# Patient Record
Sex: FEMALE | Race: WHITE | NOT HISPANIC OR LATINO
[De-identification: names, ages, dates, MRNs, and addresses within clinical notes are randomized per-mention and may not be internally consistent; named-entity substitution may affect disease eponyms.]

---

## 2019-01-17 ENCOUNTER — FORM ENCOUNTER (OUTPATIENT)
Age: 64
End: 2019-01-17

## 2019-05-23 ENCOUNTER — FORM ENCOUNTER (OUTPATIENT)
Age: 64
End: 2019-05-23

## 2019-07-08 ENCOUNTER — FORM ENCOUNTER (OUTPATIENT)
Age: 64
End: 2019-07-08

## 2019-12-12 ENCOUNTER — FORM ENCOUNTER (OUTPATIENT)
Age: 64
End: 2019-12-12

## 2020-01-13 ENCOUNTER — FORM ENCOUNTER (OUTPATIENT)
Age: 65
End: 2020-01-13

## 2020-07-16 ENCOUNTER — FORM ENCOUNTER (OUTPATIENT)
Age: 65
End: 2020-07-16

## 2021-01-20 DIAGNOSIS — Z85.3 PERSONAL HISTORY OF MALIGNANT NEOPLASM OF BREAST: ICD-10-CM

## 2021-01-20 PROBLEM — Z00.00 ENCOUNTER FOR PREVENTIVE HEALTH EXAMINATION: Status: ACTIVE | Noted: 2021-01-20

## 2021-01-20 RX ORDER — TAMOXIFEN CITRATE 20 MG/1
20 TABLET, FILM COATED ORAL DAILY
Qty: 90 | Refills: 3 | Status: ACTIVE | COMMUNITY
Start: 2021-01-20 | End: 1900-01-01

## 2021-01-21 DIAGNOSIS — Z92.3 PERSONAL HISTORY OF IRRADIATION: ICD-10-CM

## 2021-01-22 DIAGNOSIS — H40.9 UNSPECIFIED GLAUCOMA: ICD-10-CM

## 2021-01-22 DIAGNOSIS — Z80.49 FAMILY HISTORY OF MALIGNANT NEOPLASM OF OTHER GENITAL ORGANS: ICD-10-CM

## 2021-01-22 DIAGNOSIS — E03.9 HYPOTHYROIDISM, UNSPECIFIED: ICD-10-CM

## 2021-01-22 DIAGNOSIS — Z78.0 ASYMPTOMATIC MENOPAUSAL STATE: ICD-10-CM

## 2021-01-22 DIAGNOSIS — R92.0 MAMMOGRAPHIC MICROCALCIFICATION FOUND ON DIAGNOSTIC IMAGING OF BREAST: ICD-10-CM

## 2021-01-22 DIAGNOSIS — Z80.3 FAMILY HISTORY OF MALIGNANT NEOPLASM OF BREAST: ICD-10-CM

## 2021-01-22 DIAGNOSIS — Z86.000 PERSONAL HISTORY OF IN-SITU NEOPLASM OF BREAST: ICD-10-CM

## 2021-01-22 DIAGNOSIS — I10 ESSENTIAL (PRIMARY) HYPERTENSION: ICD-10-CM

## 2021-01-22 DIAGNOSIS — Z87.891 PERSONAL HISTORY OF NICOTINE DEPENDENCE: ICD-10-CM

## 2021-01-22 DIAGNOSIS — D05.11 INTRADUCTAL CARCINOMA IN SITU OF RIGHT BREAST: ICD-10-CM

## 2021-01-22 DIAGNOSIS — Z78.9 OTHER SPECIFIED HEALTH STATUS: ICD-10-CM

## 2021-01-22 DIAGNOSIS — Z80.41 FAMILY HISTORY OF MALIGNANT NEOPLASM OF OVARY: ICD-10-CM

## 2021-01-22 DIAGNOSIS — Z92.29 PERSONAL HISTORY OF OTHER DRUG THERAPY: ICD-10-CM

## 2021-01-22 RX ORDER — BIMATOPROST 0.1 MG/ML
0.01 SOLUTION/ DROPS OPHTHALMIC
Refills: 0 | Status: ACTIVE | COMMUNITY

## 2021-01-22 RX ORDER — AMLODIPINE BESYLATE AND BENAZEPRIL HYDROCHLORIDE 5; 10 MG/1; MG/1
5-10 CAPSULE ORAL
Refills: 0 | Status: ACTIVE | COMMUNITY

## 2021-01-22 RX ORDER — LEVOTHYROXINE SODIUM 200 UG/1
200 TABLET ORAL
Refills: 0 | Status: ACTIVE | COMMUNITY

## 2021-01-26 ENCOUNTER — APPOINTMENT (OUTPATIENT)
Dept: BREAST CENTER | Facility: CLINIC | Age: 66
End: 2021-01-26
Payer: MEDICARE

## 2021-01-26 VITALS
HEIGHT: 64 IN | WEIGHT: 185 LBS | SYSTOLIC BLOOD PRESSURE: 132 MMHG | DIASTOLIC BLOOD PRESSURE: 84 MMHG | HEART RATE: 76 BPM | BODY MASS INDEX: 31.58 KG/M2

## 2021-01-26 DIAGNOSIS — Z86.79 PERSONAL HISTORY OF OTHER DISEASES OF THE CIRCULATORY SYSTEM: ICD-10-CM

## 2021-01-26 PROCEDURE — 99213 OFFICE O/P EST LOW 20 MIN: CPT

## 2021-01-26 NOTE — REASON FOR VISIT
[Follow-Up: _____] : a [unfilled] follow-up visit [FreeTextEntry1] : The patient comes in for routine 6-month follow-up with a history of undergoing a right breast upper outer quadrant partial mastectomy for DCIS with 12 negative nodes in October 1997 for which she received radiation therapy.  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 Rosales on January 7, 2019.

## 2021-01-26 NOTE — PHYSICAL EXAM
[Normocephalic] : normocephalic [Atraumatic] : atraumatic [EOMI] : extra ocular movement intact [Supple] : supple [No Supraclavicular Adenopathy] : no supraclavicular adenopathy [No Cervical Adenopathy] : no cervical adenopathy [Normal Sinus Rhythm] : normal sinus rhythm [Examined in the supine and seated position] : examined in the supine and seated position [Breast Mass Right Breast ___cm] : no masses [Breast Mass Left Breast ___cm] : no masses [No Axillary Lymphadenopathy] : no left axillary lymphadenopathy [Soft] : abdomen soft [Normal Bowel Sounds] : normal bowel sounds  [Breast Nipple Inversion Left] : nipple not inverted [Breast Nipple Retraction Left] : nipple not retracted [Breast Nipple Flattening Left] : nipple not flattened [Breast Nipple Fissures Left] : nipple not fissured [de-identified] : The patient has the right breast mastectomy and MANNY flap reconstruction with a left breast mastopexy.  She has an excellent cosmetic result and has no evidence of recurrence in the right MANNY flap or in the left breast.  She did have a nipple reconstruction on October 2019.  She has no axillary, supraclavicular, or cervical adenopathy. [de-identified] : Status post mastectomy and MANNY flap reconstruction [de-identified] : Status post left breast mastopexy for symmetry

## 2021-01-26 NOTE — HISTORY OF PRESENT ILLNESS
[FreeTextEntry1] : The patient is a 65-year-old G4, P3 postmenopausal white female with Delia, Egyptian, and Kyrgyz descent.  She underwent menarche at age 13.  She underwent menopause at age 53 and never took any hormone replacement therapy.  She has a family history of breast cancer with her paternal aunts with breast cancer in her 40s and her maternal grandmother who had ovarian and uterine cancer in her 50s.  The patient was found to have a significant area of microcalcifications in the upper outer aspect of the right breast on mammography in 1997 and underwent a right breast partial mastectomy and had extensive intermediate grade DCIS in 12 negative nodes on October 13, 1997.  The DCIS was ER/TN positive.  She underwent external beam radiation to the right breast but did not receive any adjuvant hormonal therapy.  She underwent [employer] genetic panel testing which was negative in March 2016.  She underwent her routine bilateral mammography and ultrasound in April 2018 and had a group of punctate calcifications in the right breast 12:00 region 3 cm from the nipple which had a fairly benign appearance.  She then underwent a follow-up diagnostic mammography on October 16, 2018 which continued to show these calcifications and stereotactic biopsy was performed on November 5, 2018 showing high-grade DCIS which was ER/TN strongly positive.  She underwent an MRI November 15, 2018 showing some postbiopsy changes in the 12:00 region 3 cm from the nipple with around a 1.4 cm area of residual enhancement.  This was fairly close to the skin and she underwent a right breast mastectomy with attempted sentinel lymph node biopsy and right-sided MANNY flap reconstruction and left-sided reduction mastopexy by Dr. Rosales on January 7, 2019.  The dye did not travel to the right axilla due to her prior axillary dissection so no further nodes were removed.  Final pathology just showed DCIS in the right breast with a close anterior margin but final anterior margin was negative.  She was placed on tamoxifen and comes in for routine follow-up.

## 2021-01-26 NOTE — PAST MEDICAL HISTORY
[Postmenopausal] : The patient is postmenopausal [Menarche Age ____] : age at menarche was [unfilled] [Menopause Age____] : age at menopause was [unfilled] [Total Preg ___] : G[unfilled] [Live Births ___] : P[unfilled]  [History of Hormone Replacement Treatment] : has no history of hormone replacement treatment

## 2021-01-26 NOTE — ASSESSMENT
[FreeTextEntry1] : The patient is a 65-year-old G4, P3 postmenopausal white female with Delia, [place]er, and Upper sorbian descent.  She underwent menarche at age 13.  She underwent menopause at age 53 and never took any hormone replacement therapy.  She has a family history of breast cancer with her paternal aunts with breast cancer in her 40s and her maternal grandmother who had ovarian and uterine cancer in her 50s.  The patient was found to have a significant area of microcalcifications in the upper outer aspect of the right breast on mammography in 1997 and underwent a right breast partial mastectomy and had extensive intermediate grade DCIS in 12 negative nodes on October 13, 1997.  The DCIS was ER/AL positive.  She underwent external beam radiation to the right breast but did not receive any adjuvant hormonal therapy.  She underwent [employer] genetic panel testing which was negative in March 2016.  She underwent her routine bilateral mammography and ultrasound in April 2018 and had a group of punctate calcifications in the right breast 12:00 region 3 cm from the nipple which had a fairly benign appearance.  She then underwent a follow-up diagnostic mammography on October 16, 2018 which continued to show these calcifications and stereotactic biopsy was performed on November 5, 2018 showing high-grade DCIS which was ER/AL strongly positive.  She underwent an MRI November 15, 2018 showing some postbiopsy changes in the 12:00 region 3 cm from the nipple with around a 1.4 cm area of residual enhancement.  This was fairly close to the skin and she underwent a right breast mastectomy with attempted sentinel lymph node biopsy and right-sided MANNY flap reconstruction and left-sided reduction mastopexy by Dr. Rosales on January 7, 2019.  The dye did not travel to the right axilla due to her prior axillary dissection so no further nodes were removed.  Final pathology just showed DCIS in the right breast with a close anterior margin but final anterior margin was negative.  She remains on tamoxifen.  She underwent her last left breast mammography and ultrasound on July 17, 2020 [facility] showing no suspicious findings.  On exam today, she has no evidence of recurrence.  She should follow-up again in 6 months and her next left breast mammography and ultrasound will be due at that time in July 2021.

## 2021-07-26 ENCOUNTER — APPOINTMENT (OUTPATIENT)
Dept: BREAST CENTER | Facility: CLINIC | Age: 66
End: 2021-07-26
Payer: MEDICARE

## 2021-07-26 ENCOUNTER — NON-APPOINTMENT (OUTPATIENT)
Age: 66
End: 2021-07-26

## 2021-07-26 VITALS
OXYGEN SATURATION: 98 % | SYSTOLIC BLOOD PRESSURE: 121 MMHG | BODY MASS INDEX: 31.58 KG/M2 | WEIGHT: 185 LBS | HEIGHT: 64 IN | DIASTOLIC BLOOD PRESSURE: 78 MMHG | HEART RATE: 68 BPM

## 2021-07-26 PROCEDURE — 99213 OFFICE O/P EST LOW 20 MIN: CPT

## 2021-07-26 NOTE — ASSESSMENT
[FreeTextEntry1] : The patient is a 66-year-old G4, P3 postmenopausal white female with Delia, Lebanese, and French descent.  She underwent menarche at age 13.  She underwent menopause at age 53 and never took any hormone replacement therapy.  She has a family history of breast cancer with her paternal aunts with breast cancer in her 40s and her maternal grandmother who had ovarian and uterine cancer in her 50s.  The patient was found to have a significant area of microcalcifications in the upper outer aspect of the right breast on mammography in 1997 and underwent a right breast partial mastectomy and had extensive intermediate grade DCIS in 12 negative nodes on October 13, 1997.  The DCIS was ER/NY positive.  She underwent external beam radiation to the right breast but did not receive any adjuvant hormonal therapy.  She underwent [employer] genetic panel testing which was negative in March 2016.  She underwent her routine bilateral mammography and ultrasound in April 2018 and had a group of punctate calcifications in the right breast 12:00 region 3 cm from the nipple which had a fairly benign appearance.  She then underwent a follow-up diagnostic mammography on October 16, 2018 which continued to show these calcifications and stereotactic biopsy was performed on November 5, 2018 showing high-grade DCIS which was ER/NY strongly positive.  She underwent an MRI November 15, 2018 showing some postbiopsy changes in the 12:00 region 3 cm from the nipple with around a 1.4 cm area of residual enhancement.  This was fairly close to the skin and she underwent a right breast mastectomy with attempted sentinel lymph node biopsy and right-sided MANNY flap reconstruction and left-sided reduction mastopexy by Dr. Rosales on January 7, 2019.  The dye did not travel to the right axilla due to her prior axillary dissection so no further nodes were removed.  Final pathology just showed DCIS in the right breast with a close anterior margin but final anterior margin was negative.  She remains on tamoxifen.  She underwent her last left breast mammography and ultrasound on July 19, 2021 [facility] showing no suspicious findings.  On exam today, she has no evidence of recurrence.  She should follow-up again in 6 months and her next left breast mammography and ultrasound will be due  in July 2022. CC of foot pain R s/p trip and fall yesterday. no head injury

## 2021-07-26 NOTE — HISTORY OF PRESENT ILLNESS
[FreeTextEntry1] : The patient is a 66-year-old G4, P3 postmenopausal white female with Delia, South African, and Kiswahili descent.  She underwent menarche at age 13.  She underwent menopause at age 53 and never took any hormone replacement therapy.  She has a family history of breast cancer with her paternal aunts with breast cancer in her 40s and her maternal grandmother who had ovarian and uterine cancer in her 50s.  The patient was found to have a significant area of microcalcifications in the upper outer aspect of the right breast on mammography in 1997 and underwent a right breast partial mastectomy and had extensive intermediate grade DCIS in 12 negative nodes on October 13, 1997.  The DCIS was ER/NE positive.  She underwent external beam radiation to the right breast but did not receive any adjuvant hormonal therapy.  She underwent [employer] genetic panel testing which was negative in March 2016.  She underwent her routine bilateral mammography and ultrasound in April 2018 and had a group of punctate calcifications in the right breast 12:00 region 3 cm from the nipple which had a fairly benign appearance.  She then underwent a follow-up diagnostic mammography on October 16, 2018 which continued to show these calcifications and stereotactic biopsy was performed on November 5, 2018 showing high-grade DCIS which was ER/NE strongly positive.  She underwent an MRI November 15, 2018 showing some postbiopsy changes in the 12:00 region 3 cm from the nipple with around a 1.4 cm area of residual enhancement.  This was fairly close to the skin and she underwent a right breast mastectomy with attempted sentinel lymph node biopsy and right-sided MANNY flap reconstruction and left-sided reduction mastopexy by Dr. Rosales on January 7, 2019.  The dye did not travel to the right axilla due to her prior axillary dissection so no further nodes were removed.  Final pathology just showed DCIS in the right breast with a close anterior margin but final anterior margin was negative.  She was placed on tamoxifen and comes in for routine follow-up.

## 2021-07-26 NOTE — PHYSICAL EXAM
[Normocephalic] : normocephalic [Atraumatic] : atraumatic [EOMI] : extra ocular movement intact [Supple] : supple [No Supraclavicular Adenopathy] : no supraclavicular adenopathy [Normal Sinus Rhythm] : normal sinus rhythm [No Cervical Adenopathy] : no cervical adenopathy [Examined in the supine and seated position] : examined in the supine and seated position [Breast Mass Right Breast ___cm] : no masses [Breast Mass Left Breast ___cm] : no masses [No Axillary Lymphadenopathy] : no left axillary lymphadenopathy [Soft] : abdomen soft [No dominant masses] : no dominant masses in right breast  [No dominant masses] : no dominant masses left breast [No Nipple Retraction] : no left nipple retraction [No Nipple Discharge] : no left nipple discharge [Breast Nipple Inversion Left] : nipple not inverted [Breast Nipple Retraction Left] : nipple not retracted [Breast Nipple Flattening Left] : nipple not flattened [Breast Nipple Fissures Left] : nipple not fissured [Breast Abnormal Lactation (Galactorrhea) Left] : no galactorrhea [Breast Abnormal Secretion Bloody Fluid Left] : no bloody discharge [Breast Abnormal Secretion Serous Fluid Left] : no serous discharge [Breast Abnormal Secretion Opalescent Fluid Left] : no milky discharge [No Edema] : no edema [No Rashes] : no rashes [No Ulceration] : no ulceration [de-identified] : The patient has the right breast mastectomy and MANNY flap reconstruction with a left breast mastopexy.  She has an excellent cosmetic result and has no evidence of recurrence in the right MANNY flap or in the left breast.  She did have a nipple reconstruction on October 2019.  She has no axillary, supraclavicular, or cervical adenopathy. [de-identified] : Status post mastectomy and MANNY flap reconstruction with no evidence of recurrence [de-identified] : Status post left breast mastopexy for symmetry

## 2021-07-30 RX ORDER — TAMOXIFEN CITRATE 20 MG/1
20 TABLET, FILM COATED ORAL DAILY
Qty: 90 | Refills: 3 | Status: ACTIVE | COMMUNITY
Start: 2021-07-30 | End: 1900-01-01

## 2022-01-06 NOTE — PHYSICAL EXAM
[Normocephalic] : normocephalic [Atraumatic] : atraumatic [EOMI] : extra ocular movement intact [Supple] : supple [No Supraclavicular Adenopathy] : no supraclavicular adenopathy [No Cervical Adenopathy] : no cervical adenopathy [Normal Sinus Rhythm] : normal sinus rhythm [Examined in the supine and seated position] : examined in the supine and seated position [No dominant masses] : no dominant masses in right breast  [No dominant masses] : no dominant masses left breast [No Nipple Retraction] : no left nipple retraction [No Nipple Discharge] : no left nipple discharge [Breast Mass Right Breast ___cm] : no masses [Breast Nipple Inversion Left] : nipple not inverted [Breast Nipple Retraction Left] : nipple not retracted [Breast Nipple Flattening Left] : nipple not flattened [Breast Nipple Fissures Left] : nipple not fissured [Breast Abnormal Lactation (Galactorrhea) Left] : no galactorrhea [Breast Abnormal Secretion Bloody Fluid Left] : no bloody discharge [Breast Abnormal Secretion Serous Fluid Left] : no serous discharge [Breast Abnormal Secretion Opalescent Fluid Left] : no milky discharge [Breast Mass Left Breast ___cm] : no masses [No Axillary Lymphadenopathy] : no left axillary lymphadenopathy [Soft] : abdomen soft [No Edema] : no edema [No Rashes] : no rashes [No Ulceration] : no ulceration [de-identified] : The patient has the right breast mastectomy and MANNY flap reconstruction with a left breast mastopexy.  She has an excellent cosmetic result and has no evidence of recurrence in the right MANNY flap or in the left breast.  She did have a nipple reconstruction on October 2019.  She has no axillary, supraclavicular, or cervical adenopathy. [de-identified] : Status post mastectomy and MANNY flap reconstruction with no evidence of recurrence [de-identified] : Status post left breast mastopexy for symmetry

## 2022-01-06 NOTE — HISTORY OF PRESENT ILLNESS
[FreeTextEntry1] : The patient is a 66-year-old G4, P3 postmenopausal white female with Delia, Congolese, and Khmer descent.  She underwent menarche at age 13.  She underwent menopause at age 53 and never took any hormone replacement therapy.  She has a family history of breast cancer with her paternal aunts with breast cancer in her 40s and her maternal grandmother who had ovarian and uterine cancer in her 50s.  The patient was found to have a significant area of microcalcifications in the upper outer aspect of the right breast on mammography in 1997 and underwent a right breast partial mastectomy and had extensive intermediate grade DCIS and 12 negative nodes on October 13, 1997.  The DCIS was ER/WY positive.  She underwent external beam radiation to the right breast but did not receive any adjuvant hormonal therapy.  She underwent [employer] genetic panel testing which was negative in March 2016.  She underwent her routine bilateral mammography and ultrasound in April 2018 and had a group of punctate calcifications in the right breast 12:00 region 3 cm from the nipple which had a fairly benign appearance.  She then underwent a follow-up diagnostic mammography on October 16, 2018 which continued to show these calcifications and stereotactic biopsy was performed on November 5, 2018 showing high-grade DCIS which was ER/WY strongly positive.  She underwent an MRI November 15, 2018 showing some postbiopsy changes in the 12:00 region 3 cm from the nipple with around a 1.4 cm area of residual enhancement.  This was fairly close to the skin and she underwent a right breast mastectomy with attempted sentinel lymph node biopsy and right-sided MANNY flap reconstruction and left-sided reduction mastopexy by Dr. Rosales on January 7, 2019.  The dye did not travel to the right axilla due to her prior axillary dissection so no further nodes were removed.  Final pathology just showed DCIS in the right breast with a close anterior margin but final anterior margin was negative.  She was placed on tamoxifen and comes in for routine follow-up.

## 2022-01-06 NOTE — ASSESSMENT
[FreeTextEntry1] : The patient is a 66-year-old G4, P3 postmenopausal white female with Delia, Beninese, and Kyrgyz descent.  She underwent menarche at age 13.  She underwent menopause at age 53 and never took any hormone replacement therapy.  She has a family history of breast cancer with her paternal aunts with breast cancer in her 40s and her maternal grandmother who had ovarian and uterine cancer in her 50s.  The patient was found to have a significant area of microcalcifications in the upper outer aspect of the right breast on mammography in 1997 and underwent a right breast partial mastectomy and had extensive intermediate grade DCIS in 12 negative nodes on October 13, 1997.  The DCIS was ER/TN positive.  She underwent external beam radiation to the right breast but did not receive any adjuvant hormonal therapy.  She underwent [employer] genetic panel testing which was negative in March 2016.  She underwent her routine bilateral mammography and ultrasound in April 2018 and had a group of punctate calcifications in the right breast 12:00 region 3 cm from the nipple which had a fairly benign appearance.  She then underwent a follow-up diagnostic mammography on October 16, 2018 which continued to show these calcifications and stereotactic biopsy was performed on November 5, 2018 showing high-grade DCIS which was ER/TN strongly positive.  She underwent an MRI November 15, 2018 showing some postbiopsy changes in the 12:00 region 3 cm from the nipple with around a 1.4 cm area of residual enhancement.  This was fairly close to the skin and she underwent a right breast mastectomy with attempted sentinel lymph node biopsy and right-sided MANNY flap reconstruction and left-sided reduction mastopexy by Dr. Rosales on January 7, 2019.  The dye did not travel to the right axilla due to her prior axillary dissection so no further nodes were removed.  Final pathology just showed DCIS in the right breast with a close anterior margin but final anterior margin was negative.  She remains on tamoxifen.  She underwent her last left breast mammography and ultrasound on July 19, 2021 [facility] showing no suspicious findings.  On exam today, she has no evidence of recurrence.  She should follow-up again in 6 months and her next left breast mammography and ultrasound will be due  in July 2022.

## 2022-01-11 ENCOUNTER — APPOINTMENT (OUTPATIENT)
Dept: BREAST CENTER | Facility: CLINIC | Age: 67
End: 2022-01-11
Payer: MEDICARE

## 2022-01-25 ENCOUNTER — APPOINTMENT (OUTPATIENT)
Dept: BREAST CENTER | Facility: CLINIC | Age: 67
End: 2022-01-25
Payer: MEDICARE

## 2022-01-25 VITALS — SYSTOLIC BLOOD PRESSURE: 134 MMHG | HEART RATE: 87 BPM | DIASTOLIC BLOOD PRESSURE: 82 MMHG | OXYGEN SATURATION: 99 %

## 2022-01-25 DIAGNOSIS — Z85.3 PERSONAL HISTORY OF MALIGNANT NEOPLASM OF BREAST: ICD-10-CM

## 2022-01-25 PROCEDURE — 99213 OFFICE O/P EST LOW 20 MIN: CPT

## 2022-01-25 NOTE — ASSESSMENT
[FreeTextEntry1] : The patient is a 66-year-old G4, P3 postmenopausal white female with Delia, Haitian, and Chinese descent.  She underwent menarche at age 13.  She underwent menopause at age 53 and never took any hormone replacement therapy.  She has a family history of breast cancer with her paternal aunts with breast cancer in her 40s and her maternal grandmother who had ovarian and uterine cancer in her 50s.  The patient was found to have a significant area of microcalcifications in the upper outer aspect of the right breast on mammography in 1997 and underwent a right breast partial mastectomy and had extensive intermediate grade DCIS in 12 negative nodes on October 13, 1997.  The DCIS was ER/NJ positive.  She underwent external beam radiation to the right breast but did not receive any adjuvant hormonal therapy.  She underwent [employer] genetic panel testing which was negative in March 2016.  She underwent her routine bilateral mammography and ultrasound in April 2018 and had a group of punctate calcifications in the right breast 12:00 region 3 cm from the nipple which had a fairly benign appearance.  She then underwent a follow-up diagnostic mammography on October 16, 2018 which continued to show these calcifications and stereotactic biopsy was performed on November 5, 2018 showing high-grade DCIS which was ER/NJ strongly positive.  She underwent an MRI November 15, 2018 showing some postbiopsy changes in the 12:00 region 3 cm from the nipple with around a 1.4 cm area of residual enhancement.  This was fairly close to the skin and she underwent a right breast mastectomy with attempted sentinel lymph node biopsy and right-sided MANNY flap reconstruction and left-sided reduction mastopexy by Dr. Rosales on January 7, 2019.  The dye did not travel to the right axilla due to her prior axillary dissection so no further nodes were removed.  Final pathology just showed DCIS in the right breast with a close anterior margin but final anterior margin was negative.  She remains on tamoxifen.  She underwent her last left breast mammography and ultrasound on July 19, 2021 [facility] which was reviewed showing no suspicious findings.  On exam today, she has no evidence of recurrence.  She should follow-up again in 6 months and her next left breast mammography and ultrasound will be due  in July 2022 and she was given prescriptions.

## 2022-01-25 NOTE — HISTORY OF PRESENT ILLNESS
[FreeTextEntry1] : The patient is a 66-year-old G4, P3 postmenopausal white female with Delia, Venezuelan, and Malay descent.  She underwent menarche at age 13.  She underwent menopause at age 53 and never took any hormone replacement therapy.  She has a family history of breast cancer with her paternal aunts with breast cancer in her 40s and her maternal grandmother who had ovarian and uterine cancer in her 50s.  The patient was found to have a significant area of microcalcifications in the upper outer aspect of the right breast on mammography in 1997 and underwent a right breast partial mastectomy and had extensive intermediate grade DCIS and 12 negative nodes on October 13, 1997.  The DCIS was ER/SC positive.  She underwent external beam radiation to the right breast but did not receive any adjuvant hormonal therapy.  She underwent [employer] genetic panel testing which was negative in March 2016.  She underwent her routine bilateral mammography and ultrasound in April 2018 and had a group of punctate calcifications in the right breast 12:00 region 3 cm from the nipple which had a fairly benign appearance.  She then underwent a follow-up diagnostic mammography on October 16, 2018 which continued to show these calcifications and stereotactic biopsy was performed on November 5, 2018 showing high-grade DCIS which was ER/SC strongly positive.  She underwent an MRI November 15, 2018 showing some postbiopsy changes in the 12:00 region 3 cm from the nipple with around a 1.4 cm area of residual enhancement.  This was fairly close to the skin and she underwent a right breast mastectomy with attempted sentinel lymph node biopsy and right-sided MANNY flap reconstruction and left-sided reduction mastopexy by Dr. Rosales on January 7, 2019.  The dye did not travel to the right axilla due to her prior axillary dissection so no further nodes were removed.  Final pathology just showed DCIS in the right breast with a close anterior margin but final anterior margin was negative.  She was placed on tamoxifen and comes in for routine follow-up.

## 2022-01-25 NOTE — PHYSICAL EXAM
[Normocephalic] : normocephalic [Atraumatic] : atraumatic [EOMI] : extra ocular movement intact [Supple] : supple [No Supraclavicular Adenopathy] : no supraclavicular adenopathy [No Cervical Adenopathy] : no cervical adenopathy [Normal Sinus Rhythm] : normal sinus rhythm [Examined in the supine and seated position] : examined in the supine and seated position [No dominant masses] : no dominant masses in right breast  [No dominant masses] : no dominant masses left breast [No Nipple Retraction] : no left nipple retraction [No Nipple Discharge] : no left nipple discharge [Breast Mass Right Breast ___cm] : no masses [Breast Mass Left Breast ___cm] : no masses [No Axillary Lymphadenopathy] : no left axillary lymphadenopathy [No Edema] : no edema [No Rashes] : no rashes [No Ulceration] : no ulceration [Breast Nipple Inversion Left] : nipple not inverted [Breast Nipple Retraction Left] : nipple not retracted [Breast Nipple Flattening Left] : nipple not flattened [Breast Nipple Fissures Left] : nipple not fissured [Breast Abnormal Lactation (Galactorrhea) Left] : no galactorrhea [Breast Abnormal Secretion Bloody Fluid Left] : no bloody discharge [Breast Abnormal Secretion Serous Fluid Left] : no serous discharge [Breast Abnormal Secretion Opalescent Fluid Left] : no milky discharge [de-identified] : The patient has the right breast mastectomy and MANNY flap reconstruction with a left breast mastopexy.  She has an excellent cosmetic result and has no evidence of recurrence in the right MANNY flap or in the left breast.  She did have a nipple reconstruction on October 2019.  She has no axillary, supraclavicular, or cervical adenopathy. [de-identified] : Status post mastectomy and MANNY flap reconstruction with no evidence of recurrence [de-identified] : Status post left breast mastopexy for symmetry

## 2022-07-25 NOTE — PHYSICAL EXAM
[Normocephalic] : normocephalic [Atraumatic] : atraumatic [EOMI] : extra ocular movement intact [No Supraclavicular Adenopathy] : no supraclavicular adenopathy [Supple] : supple [No Cervical Adenopathy] : no cervical adenopathy [Normal Sinus Rhythm] : normal sinus rhythm [Examined in the supine and seated position] : examined in the supine and seated position [No dominant masses] : no dominant masses in right breast  [No dominant masses] : no dominant masses left breast [No Nipple Retraction] : no left nipple retraction [Breast Mass Right Breast ___cm] : no masses [No Nipple Discharge] : no left nipple discharge [Breast Nipple Inversion Left] : nipple not inverted [Breast Nipple Retraction Left] : nipple not retracted [Breast Nipple Fissures Left] : nipple not fissured [Breast Nipple Flattening Left] : nipple not flattened [Breast Abnormal Lactation (Galactorrhea) Left] : no galactorrhea [Breast Abnormal Secretion Bloody Fluid Left] : no bloody discharge [Breast Abnormal Secretion Serous Fluid Left] : no serous discharge [Breast Abnormal Secretion Opalescent Fluid Left] : no milky discharge [Breast Mass Left Breast ___cm] : no masses [No Axillary Lymphadenopathy] : no left axillary lymphadenopathy [No Edema] : no edema [No Rashes] : no rashes [No Ulceration] : no ulceration [de-identified] : The patient has the right breast mastectomy and MANNY flap reconstruction with a left breast mastopexy.  She has an excellent cosmetic result and has no evidence of recurrence in the right MANNY flap or in the left breast.  She did have a nipple reconstruction on October 2019.  She has no axillary, supraclavicular, or cervical adenopathy. [de-identified] : Status post mastectomy and MANNY flap reconstruction with no evidence of recurrence [de-identified] : Status post left breast mastopexy for symmetry

## 2022-07-25 NOTE — ASSESSMENT
[FreeTextEntry1] : The patient is a 67-year-old G4, P3 postmenopausal white female with Delia, Lao, and Divehi descent.  She underwent menarche at age 13.  She underwent menopause at age 53 and never took any hormone replacement therapy.  She has a family history of breast cancer with her paternal aunts with breast cancer in her 40s and her maternal grandmother who had ovarian and uterine cancer in her 50s.  The patient was found to have a significant area of microcalcifications in the upper outer aspect of the right breast on mammography in 1997 and underwent a right breast partial mastectomy and had extensive intermediate grade DCIS and 12 negative nodes on October 13, 1997.  The DCIS was ER/SC positive.  She underwent external beam radiation to the right breast but did not receive any adjuvant hormonal therapy.  She underwent [employer] genetic panel testing which was negative in March 2016.  She underwent her routine bilateral mammography and ultrasound in April 2018 and had a group of punctate calcifications in the right breast 12:00 region 3 cm from the nipple which had a fairly benign appearance.  She then underwent a follow-up diagnostic mammography on October 16, 2018 which continued to show these calcifications and stereotactic biopsy was performed on November 5, 2018 showing high-grade DCIS which was ER/SC strongly positive.  She underwent an MRI November 15, 2018 showing some postbiopsy changes in the 12:00 region 3 cm from the nipple with around a 1.4 cm area of residual enhancement.  This was fairly close to the skin and she underwent a right breast mastectomy with attempted sentinel lymph node biopsy and right-sided MANNY flap reconstruction and left-sided reduction mastopexy by Dr. Rosales on January 7, 2019.  The dye did not travel to the right axilla due to her prior axillary dissection so no further nodes were removed.  Final pathology just showed DCIS in the right breast with a close anterior margin but final anterior margin was negative.  She remains on tamoxifen.  She underwent her last left breast mammography and ultrasound which was reviewed from ?????? July 19, 2021 [facility] which was reviewed showing no suspicious findings.  On exam today, she has no evidence of recurrence.  She should follow-up again in 6 months and her next left breast mammography and ultrasound will be due  in ??????? and she was given prescriptions.

## 2022-07-25 NOTE — HISTORY OF PRESENT ILLNESS
[FreeTextEntry1] : The patient is a 67-year-old G4, P3 postmenopausal white female with Delia, [place] citizen, and Welsh descent.  She underwent menarche at age 13.  She underwent menopause at age 53 and never took any hormone replacement therapy.  She has a family history of breast cancer with her paternal aunts with breast cancer in her 40s and her maternal grandmother who had ovarian and uterine cancer in her 50s.  The patient was found to have a significant area of microcalcifications in the upper outer aspect of the right breast on mammography in 1997 and underwent a right breast partial mastectomy and had extensive intermediate grade DCIS and 12 negative nodes on October 13, 1997.  The DCIS was ER/AL positive.  She underwent external beam radiation to the right breast but did not receive any adjuvant hormonal therapy.  She underwent [employer] genetic panel testing which was negative in March 2016.  She underwent her routine bilateral mammography and ultrasound in April 2018 and had a group of punctate calcifications in the right breast 12:00 region 3 cm from the nipple which had a fairly benign appearance.  She then underwent a follow-up diagnostic mammography on October 16, 2018 which continued to show these calcifications and stereotactic biopsy was performed on November 5, 2018 showing high-grade DCIS which was ER/AL strongly positive.  She underwent an MRI November 15, 2018 showing some postbiopsy changes in the 12:00 region 3 cm from the nipple with around a 1.4 cm area of residual enhancement.  This was fairly close to the skin and she underwent a right breast mastectomy with attempted sentinel lymph node biopsy and right-sided MANNY flap reconstruction and left-sided reduction mastopexy by Dr. Rosales on January 7, 2019.  The dye did not travel to the right axilla due to her prior axillary dissection so no further nodes were removed.  Final pathology just showed DCIS in the right breast with a close anterior margin but final anterior margin was negative.  She was placed on tamoxifen and comes in for routine follow-up.

## 2022-07-27 ENCOUNTER — APPOINTMENT (OUTPATIENT)
Dept: BREAST CENTER | Facility: CLINIC | Age: 67
End: 2022-07-27

## 2022-08-22 ENCOUNTER — APPOINTMENT (OUTPATIENT)
Dept: BREAST CENTER | Facility: CLINIC | Age: 67
End: 2022-08-22

## 2022-08-22 VITALS
SYSTOLIC BLOOD PRESSURE: 117 MMHG | DIASTOLIC BLOOD PRESSURE: 77 MMHG | BODY MASS INDEX: 31.76 KG/M2 | HEART RATE: 86 BPM | OXYGEN SATURATION: 99 % | WEIGHT: 185 LBS

## 2022-08-22 DIAGNOSIS — R92.2 INCONCLUSIVE MAMMOGRAM: ICD-10-CM

## 2022-08-22 PROCEDURE — 99213 OFFICE O/P EST LOW 20 MIN: CPT

## 2022-08-22 NOTE — HISTORY OF PRESENT ILLNESS
[FreeTextEntry1] : The patient is a 67-year-old G4, P3 postmenopausal white female with Delia, Eritrean, and Latvian descent.  She underwent menarche at age 13.  She underwent menopause at age 53 and never took any hormone replacement therapy.  She has a family history of breast cancer with her paternal aunts with breast cancer in her 40s and her maternal grandmother who had ovarian and uterine cancer in her 50s.  The patient was found to have a significant area of microcalcifications in the upper outer aspect of the right breast on mammography in 1997 and underwent a right breast partial mastectomy and had extensive intermediate grade DCIS and 12 negative nodes on October 13, 1997.  The DCIS was ER/AR positive.  She underwent external beam radiation to the right breast but did not receive any adjuvant hormonal therapy.  She underwent [employer] genetic panel testing which was negative in March 2016.  She underwent her routine bilateral mammography and ultrasound in April 2018 and had a group of punctate calcifications in the right breast 12:00 region 3 cm from the nipple which had a fairly benign appearance.  She then underwent a follow-up diagnostic mammography on October 16, 2018 which continued to show these calcifications and stereotactic biopsy was performed on November 5, 2018 showing high-grade DCIS which was ER/AR strongly positive.  She underwent an MRI November 15, 2018 showing some postbiopsy changes in the 12:00 region 3 cm from the nipple with around a 1.4 cm area of residual enhancement.  This was fairly close to the skin and she underwent a right breast mastectomy with attempted sentinel lymph node biopsy and right-sided MANNY flap reconstruction and left-sided reduction mastopexy by Dr. Rosales on January 7, 2019.  The dye did not travel to the right axilla due to her prior axillary dissection so no further nodes were removed.  Final pathology just showed DCIS in the right breast with a close anterior margin but final anterior margin was negative.  She was placed on tamoxifen and comes in for routine follow-up.

## 2022-08-22 NOTE — PHYSICAL EXAM
[Normocephalic] : normocephalic [Atraumatic] : atraumatic [EOMI] : extra ocular movement intact [Supple] : supple [No Supraclavicular Adenopathy] : no supraclavicular adenopathy [No Cervical Adenopathy] : no cervical adenopathy [Normal Sinus Rhythm] : normal sinus rhythm [Examined in the supine and seated position] : examined in the supine and seated position [No dominant masses] : no dominant masses in right breast  [No dominant masses] : no dominant masses left breast [No Nipple Retraction] : no left nipple retraction [No Nipple Discharge] : no left nipple discharge [Breast Mass Right Breast ___cm] : no masses [Breast Mass Left Breast ___cm] : no masses [No Axillary Lymphadenopathy] : no left axillary lymphadenopathy [No Edema] : no edema [No Rashes] : no rashes [No Ulceration] : no ulceration [Breast Nipple Inversion Left] : nipple not inverted [Breast Nipple Retraction Left] : nipple not retracted [Breast Nipple Flattening Left] : nipple not flattened [Breast Nipple Fissures Left] : nipple not fissured [Breast Abnormal Lactation (Galactorrhea) Left] : no galactorrhea [Breast Abnormal Secretion Bloody Fluid Left] : no bloody discharge [Breast Abnormal Secretion Serous Fluid Left] : no serous discharge [Breast Abnormal Secretion Opalescent Fluid Left] : no milky discharge [de-identified] : The patient has the right breast mastectomy and MANNY flap reconstruction with a left breast mastopexy.  She has an excellent cosmetic result and has no evidence of recurrence in the right MANNY flap or in the left breast.  She did have a nipple reconstruction on October 2019.  She has no axillary, supraclavicular, or cervical adenopathy. [de-identified] : Status post mastectomy and MANNY flap reconstruction with no evidence of recurrence [de-identified] : Status post left breast mastopexy for symmetry

## 2022-08-22 NOTE — ASSESSMENT
[FreeTextEntry1] : The patient is a 67-year-old G4, P3 postmenopausal white female with Delia, Citizen of [place], and Polish descent.  She underwent menarche at age 13.  She underwent menopause at age 53 and never took any hormone replacement therapy.  She has a family history of breast cancer with her paternal aunt who had breast cancer in her 40s and her maternal grandmother who had ovarian and uterine cancer in her 50s.  The patient was found to have a significant area of microcalcifications in the upper outer aspect of the right breast on mammography in 1997 and underwent a right breast partial mastectomy and had extensive intermediate grade DCIS and 12 negative nodes on October 13, 1997.  The DCIS was ER/WV positive.  She underwent external beam radiation to the right breast but did not receive any adjuvant hormonal therapy.  She underwent [employer] genetic panel testing which was negative in March 2016.  She underwent her routine bilateral mammography and ultrasound in April 2018 and had a group of punctate calcifications in the right breast 12:00 region 3 cm from the nipple which had a fairly benign appearance.  She then underwent a follow-up diagnostic mammography on October 16, 2018 which continued to show these calcifications and stereotactic biopsy was performed on November 5, 2018 showing high-grade DCIS which was ER/WV strongly positive.  She underwent an MRI November 15, 2018 showing some postbiopsy changes in the 12:00 region 3 cm from the nipple with around a 1.4 cm area of residual enhancement.  This was fairly close to the skin and she underwent a right breast mastectomy with attempted sentinel lymph node biopsy and right-sided MANNY flap reconstruction and left-sided reduction mastopexy by Dr. Rosales on January 7, 2019.  The dye did not travel to the right axilla due to her prior axillary dissection so no further nodes were removed.  Final pathology just showed DCIS in the right breast with a close anterior margin but final anterior margin was negative.  She remains on tamoxifen.  She underwent her last left breast mammography and ultrasound was reviewed from July 20, 2022 performed at [facility] which was reviewed showing no suspicious findings.  On exam today, she has no evidence of recurrence.  She should follow-up again in 6 months and her next left breast mammography and ultrasound will be due  in July 2023 and she was given prescriptions.  She will remain on tamoxifen.

## 2023-02-08 ENCOUNTER — APPOINTMENT (OUTPATIENT)
Dept: BREAST CENTER | Facility: CLINIC | Age: 68
End: 2023-02-08
Payer: MEDICARE

## 2023-02-08 ENCOUNTER — NON-APPOINTMENT (OUTPATIENT)
Age: 68
End: 2023-02-08

## 2023-02-08 PROCEDURE — 99213 OFFICE O/P EST LOW 20 MIN: CPT

## 2023-02-08 NOTE — HISTORY OF PRESENT ILLNESS
[FreeTextEntry1] : The patient is a 67-year-old G4, P3 postmenopausal white female with Delia, Chinese, and Georgian descent.  She underwent menarche at age 13.  She underwent menopause at age 53 and never took any hormone replacement therapy.  She has a family history of breast cancer with her paternal aunts with breast cancer in her 40s and her maternal grandmother who had ovarian and uterine cancer in her 50s.  The patient was found to have a significant area of microcalcifications in the upper outer aspect of the right breast on mammography in 1997 and underwent a right breast partial mastectomy and had extensive intermediate grade DCIS and 12 negative nodes on October 13, 1997.  The DCIS was ER/SC positive.  She underwent external beam radiation to the right breast but did not receive any adjuvant hormonal therapy.  She underwent [employer] genetic panel testing which was negative in March 2016.  She underwent her routine bilateral mammography and ultrasound in April 2018 and had a group of punctate calcifications in the right breast 12:00 region 3 cm from the nipple which had a fairly benign appearance.  She then underwent a follow-up diagnostic mammography on October 16, 2018 which continued to show these calcifications and stereotactic biopsy was performed on November 5, 2018 showing high-grade DCIS which was ER/SC strongly positive.  She underwent an MRI November 15, 2018 showing some postbiopsy changes in the 12:00 region 3 cm from the nipple with around a 1.4 cm area of residual enhancement.  This was fairly close to the skin and she underwent a right breast mastectomy with attempted sentinel lymph node biopsy and right-sided MANNY flap reconstruction and left-sided reduction mastopexy by Dr. Rosales on January 7, 2019.  The dye did not travel to the right axilla due to her prior axillary dissection so no further nodes were removed.  Final pathology just showed DCIS in the right breast with a close anterior margin but final anterior margin was negative.  She was placed on tamoxifen and comes in for routine follow-up.

## 2023-02-08 NOTE — ASSESSMENT
[FreeTextEntry1] : The patient is a 67-year-old G4, P3 postmenopausal white female with Delia, Taiwanese, and Faroese descent.  She underwent menarche at age 13.  She underwent menopause at age 53 and never took any hormone replacement therapy.  She has a family history of breast cancer with her paternal aunt who had breast cancer in her 40s and her maternal grandmother who had ovarian and uterine cancer in her 50s.  The patient was found to have a significant area of microcalcifications in the upper outer aspect of the right breast on mammography in 1997 and underwent a right breast partial mastectomy and had extensive intermediate grade DCIS and 12 negative nodes on October 13, 1997.  The DCIS was ER/MN positive.  She underwent external beam radiation to the right breast but did not receive any adjuvant hormonal therapy.  She underwent [employer] genetic panel testing which was negative in March 2016.  She underwent her routine bilateral mammography and ultrasound in April 2018 and had a group of punctate calcifications in the right breast 12:00 region 3 cm from the nipple which had a fairly benign appearance.  She then underwent a follow-up diagnostic mammography on October 16, 2018 which continued to show these calcifications and stereotactic biopsy was performed on November 5, 2018 showing high-grade DCIS which was ER/MN strongly positive.  She underwent an MRI November 15, 2018 showing some postbiopsy changes in the 12:00 region 3 cm from the nipple with around a 1.4 cm area of residual enhancement.  This was fairly close to the skin and she underwent a right breast mastectomy with attempted sentinel lymph node biopsy and right-sided MANNY flap reconstruction and left-sided reduction mastopexy by Dr. Rosales on January 7, 2019.  The dye did not travel to the right axilla due to her prior axillary dissection so no further nodes were removed.  Final pathology just showed DCIS in the right breast with a close anterior margin but final anterior margin was negative.  She remains on tamoxifen.  She underwent her last left breast mammography and ultrasound which was reviewed from July 20, 2022 and performed at [facility] which showed no suspicious findings.  On exam today, she has no evidence of recurrence.  She should follow-up again in 6 months and her next left breast mammography and ultrasound will be due  in July 2023 and she was given prescriptions.  She will remain on tamoxifen.

## 2023-02-08 NOTE — PHYSICAL EXAM
[Normocephalic] : normocephalic [Atraumatic] : atraumatic [EOMI] : extra ocular movement intact [Supple] : supple [No Supraclavicular Adenopathy] : no supraclavicular adenopathy [No Cervical Adenopathy] : no cervical adenopathy [Normal Sinus Rhythm] : normal sinus rhythm [Examined in the supine and seated position] : examined in the supine and seated position [No dominant masses] : no dominant masses in right breast  [No dominant masses] : no dominant masses left breast [No Nipple Retraction] : no left nipple retraction [No Nipple Discharge] : no left nipple discharge [Breast Mass Right Breast ___cm] : no masses [Breast Mass Left Breast ___cm] : no masses [No Axillary Lymphadenopathy] : no left axillary lymphadenopathy [No Edema] : no edema [No Rashes] : no rashes [No Ulceration] : no ulceration [Breast Nipple Inversion Left] : nipple not inverted [Breast Nipple Retraction Left] : nipple not retracted [Breast Nipple Flattening Left] : nipple not flattened [Breast Nipple Fissures Left] : nipple not fissured [Breast Abnormal Lactation (Galactorrhea) Left] : no galactorrhea [Breast Abnormal Secretion Bloody Fluid Left] : no bloody discharge [Breast Abnormal Secretion Serous Fluid Left] : no serous discharge [Breast Abnormal Secretion Opalescent Fluid Left] : no milky discharge [de-identified] : The patient has the right breast mastectomy and MANNY flap reconstruction with a left breast mastopexy.  She has an excellent cosmetic result and has no evidence of recurrence in the right MANNY flap or in the left breast.  She did have a nipple reconstruction on October 2019.  She has no axillary, supraclavicular, or cervical adenopathy. [de-identified] : Status post mastectomy and MANNY flap reconstruction with no evidence of recurrence [de-identified] : Status post left breast mastopexy for symmetry

## 2023-07-13 ENCOUNTER — RX RENEWAL (OUTPATIENT)
Age: 68
End: 2023-07-13

## 2023-07-13 RX ORDER — TAMOXIFEN CITRATE 20 MG/1
20 TABLET, FILM COATED ORAL
Qty: 90 | Refills: 2 | Status: ACTIVE | COMMUNITY
Start: 2022-10-20 | End: 1900-01-01

## 2023-08-09 ENCOUNTER — APPOINTMENT (OUTPATIENT)
Dept: BREAST CENTER | Facility: CLINIC | Age: 68
End: 2023-08-09
Payer: MEDICARE

## 2023-08-09 VITALS
OXYGEN SATURATION: 96 % | WEIGHT: 180 LBS | DIASTOLIC BLOOD PRESSURE: 77 MMHG | SYSTOLIC BLOOD PRESSURE: 115 MMHG | HEIGHT: 64 IN | HEART RATE: 78 BPM | BODY MASS INDEX: 30.73 KG/M2

## 2023-08-09 PROCEDURE — 99213 OFFICE O/P EST LOW 20 MIN: CPT

## 2023-08-09 NOTE — HISTORY OF PRESENT ILLNESS
[FreeTextEntry1] : The patient is a 68-year-old G4, P3 postmenopausal white female with Delia, Spanish, and Portuguese descent.  She underwent menarche at age 13.  She underwent menopause at age 53 and never took any hormone replacement therapy.  She has a family history of breast cancer with her paternal aunts with breast cancer in her 40s and her maternal grandmother who had ovarian and uterine cancer in her 50s.  The patient was found to have a significant area of microcalcifications in the upper outer aspect of the right breast on mammography in 1997 and underwent a right breast partial mastectomy and had extensive intermediate grade DCIS and 12 negative nodes on October 13, 1997.  The DCIS was ER/RI positive.  She underwent external beam radiation to the right breast but did not receive any adjuvant hormonal therapy.  She underwent [employer] genetic panel testing which was negative in March 2016.  She underwent her routine bilateral mammography and ultrasound in April 2018 and had a group of punctate calcifications in the right breast 12:00 region 3 cm from the nipple which had a fairly benign appearance.  She then underwent a follow-up diagnostic mammography on October 16, 2018 which continued to show these calcifications and stereotactic biopsy was performed on November 5, 2018 showing high-grade DCIS which was ER/RI strongly positive.  She underwent an MRI November 15, 2018 showing some postbiopsy changes in the 12:00 region 3 cm from the nipple with around a 1.4 cm area of residual enhancement.  This was fairly close to the skin and she underwent a right breast mastectomy with attempted sentinel lymph node biopsy and right-sided MANNY flap reconstruction and left-sided reduction mastopexy by Dr. Rosales on January 7, 2019.  The dye did not travel to the right axilla due to her prior axillary dissection so no further nodes were removed.  Final pathology just showed DCIS in the right breast with a close anterior margin but final anterior margin was negative.  She was placed on tamoxifen and comes in for routine follow-up.

## 2023-08-09 NOTE — PHYSICAL EXAM
[Normocephalic] : normocephalic [Atraumatic] : atraumatic [EOMI] : extra ocular movement intact [Supple] : supple [No Supraclavicular Adenopathy] : no supraclavicular adenopathy [No Cervical Adenopathy] : no cervical adenopathy [Normal Sinus Rhythm] : normal sinus rhythm [Examined in the supine and seated position] : examined in the supine and seated position [No dominant masses] : no dominant masses in right breast  [No dominant masses] : no dominant masses left breast [No Nipple Retraction] : no left nipple retraction [No Nipple Discharge] : no left nipple discharge [Breast Mass Right Breast ___cm] : no masses [Breast Mass Left Breast ___cm] : no masses [No Axillary Lymphadenopathy] : no left axillary lymphadenopathy [No Edema] : no edema [No Rashes] : no rashes [No Ulceration] : no ulceration [Breast Nipple Inversion Left] : nipple not inverted [Breast Nipple Retraction Left] : nipple not retracted [Breast Nipple Flattening Left] : nipple not flattened [Breast Nipple Fissures Left] : nipple not fissured [Breast Abnormal Lactation (Galactorrhea) Left] : no galactorrhea [Breast Abnormal Secretion Bloody Fluid Left] : no bloody discharge [Breast Abnormal Secretion Serous Fluid Left] : no serous discharge [Breast Abnormal Secretion Opalescent Fluid Left] : no milky discharge [de-identified] : The patient has the right breast mastectomy and MANNY flap reconstruction with a left breast mastopexy.  She has an excellent cosmetic result and has no evidence of recurrence in the right MANNY flap or in the left breast.  She did have a nipple reconstruction on October 2019.  She has no axillary, supraclavicular, or cervical adenopathy. [de-identified] : Status post mastectomy and MANNY flap reconstruction with no evidence of recurrence [de-identified] : Status post left breast mastopexy for symmetry

## 2023-08-09 NOTE — ASSESSMENT
[FreeTextEntry1] : The patient is a 68-year-old G4, P3 postmenopausal white female with Delia, Polish, and Pashto descent.  She underwent menarche at age 13.  She underwent menopause at age 53 and never took any hormone replacement therapy.  She has a family history of breast cancer with her paternal aunt who had breast cancer in her 40s and her maternal grandmother who had ovarian and uterine cancer in her 50s.  The patient was found to have a significant area of microcalcifications in the upper outer aspect of the right breast on mammography in 1997 and underwent a right breast partial mastectomy and had extensive intermediate grade DCIS and 12 negative nodes on October 13, 1997.  The DCIS was ER/ID positive.  She underwent external beam radiation to the right breast but did not receive any adjuvant hormonal therapy.  She underwent [employer] genetic panel testing which was negative in March 2016.  She underwent her routine bilateral mammography and ultrasound in April 2018 and had a group of punctate calcifications in the right breast 12:00 region 3 cm from the nipple which had a fairly benign appearance.  She then underwent a follow-up diagnostic mammography on October 16, 2018 which continued to show these calcifications and stereotactic biopsy was performed on November 5, 2018 showing high-grade DCIS which was ER/ID strongly positive.  She underwent an MRI November 15, 2018 showing some postbiopsy changes in the 12:00 region 3 cm from the nipple with around a 1.4 cm area of residual enhancement.  This was fairly close to the skin and she underwent a right breast mastectomy with attempted sentinel lymph node biopsy and right-sided MANNY flap reconstruction and left-sided reduction mastopexy by Dr. Rosales on January 7, 2019.  The dye did not travel to the right axilla due to her prior axillary dissection so no further nodes were removed.  Final pathology just showed DCIS in the right breast with a close anterior margin but final anterior margin was negative.  She remains on tamoxifen.  She underwent her last left breast mammography and ultrasound which was reviewed from July 28, 2023 and performed at [facility] which showed no suspicious findings.  On exam today, she has no evidence of recurrence.  She should follow-up again in 6 months and her next left breast mammography and ultrasound will be due in July 2024 and she was given prescriptions.  She will remain on tamoxifen.  She can start following up yearly after her next visit since she will be 5 years postop and she will also be able to stop tamoxifen at that time.

## 2023-10-01 PROBLEM — Z92.3 HISTORY OF RADIATION THERAPY: Status: RESOLVED | Noted: 2021-01-22 | Resolved: 2023-10-01

## 2024-01-28 ENCOUNTER — NON-APPOINTMENT (OUTPATIENT)
Age: 69
End: 2024-01-28

## 2024-02-07 NOTE — PHYSICAL EXAM
[Normocephalic] : normocephalic [Atraumatic] : atraumatic [EOMI] : extra ocular movement intact [Supple] : supple [No Supraclavicular Adenopathy] : no supraclavicular adenopathy [No Cervical Adenopathy] : no cervical adenopathy [Normal Sinus Rhythm] : normal sinus rhythm [Examined in the supine and seated position] : examined in the supine and seated position [No dominant masses] : no dominant masses in right breast  [No dominant masses] : no dominant masses left breast [No Nipple Retraction] : no left nipple retraction [No Nipple Discharge] : no left nipple discharge [Breast Mass Right Breast ___cm] : no masses [Breast Mass Left Breast ___cm] : no masses [No Axillary Lymphadenopathy] : no left axillary lymphadenopathy [No Edema] : no edema [No Rashes] : no rashes [No Ulceration] : no ulceration [Breast Nipple Inversion Left] : nipple not inverted [Breast Nipple Retraction Left] : nipple not retracted [Breast Nipple Flattening Left] : nipple not flattened [Breast Nipple Fissures Left] : nipple not fissured [Breast Abnormal Lactation (Galactorrhea) Left] : no galactorrhea [Breast Abnormal Secretion Bloody Fluid Left] : no bloody discharge [Breast Abnormal Secretion Serous Fluid Left] : no serous discharge [Breast Abnormal Secretion Opalescent Fluid Left] : no milky discharge [de-identified] : The patient has the right breast mastectomy and MANNY flap reconstruction with a left breast mastopexy.  She has an excellent cosmetic result and has no evidence of recurrence in the right MANNY flap or in the left breast.  She did have a nipple reconstruction on October 2019.  She has no axillary, supraclavicular, or cervical adenopathy. [de-identified] : Status post mastectomy and MANNY flap reconstruction with no evidence of recurrence [de-identified] : Status post left breast mastopexy for symmetry

## 2024-02-07 NOTE — HISTORY OF PRESENT ILLNESS
[FreeTextEntry1] : The patient is a 68-year-old G4, P3 postmenopausal white female with Delia, Saudi Arabian, and Spanish descent.  She underwent menarche at age 13.  She underwent menopause at age 53 and never took any hormone replacement therapy.  She has a family history of breast cancer with her paternal aunts with breast cancer in her 40s and her maternal grandmother who had ovarian and uterine cancer in her 50s.  The patient was found to have a significant area of microcalcifications in the upper outer aspect of the right breast on mammography in 1997 and underwent a right breast partial mastectomy and had extensive intermediate grade DCIS and 12 negative nodes on October 13, 1997.  The DCIS was ER/PA positive.  She underwent external beam radiation to the right breast but did not receive any adjuvant hormonal therapy.  She underwent [employer] genetic panel testing which was negative in March 2016.  She underwent her routine bilateral mammography and ultrasound in April 2018 and had a group of punctate calcifications in the right breast 12:00 region 3 cm from the nipple which had a fairly benign appearance.  She then underwent a follow-up diagnostic mammography on October 16, 2018 which continued to show these calcifications and stereotactic biopsy was performed on November 5, 2018 showing high-grade DCIS which was ER/PA strongly positive.  She underwent an MRI November 15, 2018 showing some postbiopsy changes in the 12:00 region 3 cm from the nipple with around a 1.4 cm area of residual enhancement.  This was fairly close to the skin and she underwent a right breast mastectomy with attempted sentinel lymph node biopsy and right-sided MANNY flap reconstruction and left-sided reduction mastopexy by Dr. Rosales on January 7, 2019.  The dye did not travel to the right axilla due to her prior axillary dissection so no further nodes were removed.  Final pathology just showed DCIS in the right breast with a close anterior margin but final anterior margin was negative.  She was placed on tamoxifen and comes in for routine follow-up.

## 2024-02-07 NOTE — ASSESSMENT
[FreeTextEntry1] : The patient is a 68-year-old G4, P3 postmenopausal white female with Delia, Vietnamese, and Slovak descent.  She underwent menarche at age 13.  She underwent menopause at age 53 and never took any hormone replacement therapy.  She has a family history of breast cancer with her paternal aunt who had breast cancer in her 40s and her maternal grandmother who had ovarian and uterine cancer in her 50s.  The patient was found to have a significant area of microcalcifications in the upper outer aspect of the right breast on mammography in 1997 and underwent a right breast partial mastectomy and had extensive intermediate grade DCIS and 12 negative nodes on October 13, 1997.  The DCIS was ER/SC positive.  She underwent external beam radiation to the right breast but did not receive any adjuvant hormonal therapy.  She underwent [employer] genetic panel testing which was negative in March 2016.  She underwent her routine bilateral mammography and ultrasound in April 2018 and had a group of punctate calcifications in the right breast 12:00 region 3 cm from the nipple which had a fairly benign appearance.  She then underwent a follow-up diagnostic mammography on October 16, 2018 which continued to show these calcifications and stereotactic biopsy was performed on November 5, 2018 showing high-grade DCIS which was ER/SC strongly positive.  She underwent an MRI November 15, 2018 showing some postbiopsy changes in the 12:00 region 3 cm from the nipple with around a 1.4 cm area of residual enhancement.  This was fairly close to the skin and she underwent a right breast mastectomy with attempted sentinel lymph node biopsy and right-sided MANNY flap reconstruction and left-sided reduction mastopexy by Dr. Rosales on January 7, 2019.  The dye did not travel to the right axilla due to her prior axillary dissection so no further nodes were removed.  Final pathology just showed DCIS in the right breast with a close anterior margin but final anterior margin was negative.  She remains on tamoxifen.  She underwent her last left breast mammography and ultrasound which was reviewed from July 28, 2023 and performed at [facility] which showed no suspicious findings.  On exam today, she has no evidence of recurrence.  She should follow-up again in 1 year and her next left breast mammography and ultrasound will be due in July 2024 and she was given prescriptions.  She will remain on tamoxifen ?????.

## 2024-02-14 ENCOUNTER — APPOINTMENT (OUTPATIENT)
Dept: BREAST CENTER | Facility: CLINIC | Age: 69
End: 2024-02-14
Payer: COMMERCIAL

## 2024-02-14 DIAGNOSIS — Z85.3 PERSONAL HISTORY OF MALIGNANT NEOPLASM OF BREAST: ICD-10-CM

## 2024-02-14 DIAGNOSIS — Z80.41 FAMILY HISTORY OF MALIGNANT NEOPLASM OF OVARY: ICD-10-CM

## 2024-02-14 DIAGNOSIS — Z90.11 ACQUIRED ABSENCE OF RIGHT BREAST AND NIPPLE: ICD-10-CM

## 2024-02-14 DIAGNOSIS — Z80.3 FAMILY HISTORY OF MALIGNANT NEOPLASM OF BREAST: ICD-10-CM

## 2024-02-14 PROCEDURE — 99213 OFFICE O/P EST LOW 20 MIN: CPT

## 2024-02-14 PROCEDURE — G2211 COMPLEX E/M VISIT ADD ON: CPT

## 2024-02-14 NOTE — ASSESSMENT
[FreeTextEntry1] : The patient is a 68-year-old G4, P3 postmenopausal white female with Delia, [place]er, and Sami descent.  She underwent menarche at age 13.  She underwent menopause at age 53 and never took any hormone replacement therapy.  She has a family history of breast cancer with her paternal aunt who had breast cancer in her 40s and her maternal grandmother who had ovarian and uterine cancer in her 50s.  The patient was found to have a significant area of microcalcifications in the upper outer aspect of the right breast on mammography in 1997 and underwent a right breast partial mastectomy and had extensive intermediate grade DCIS and 12 negative nodes on October 13, 1997.  The DCIS was ER/NM positive.  She underwent external beam radiation to the right breast but did not receive any adjuvant hormonal therapy.  She underwent [employer] genetic panel testing which was negative in March 2016.  She underwent her routine bilateral mammography and ultrasound in April 2018 and had a group of punctate calcifications in the right breast 12:00 region 3 cm from the nipple which had a fairly benign appearance.  She then underwent a follow-up diagnostic mammography on October 16, 2018 which continued to show these calcifications and stereotactic biopsy was performed on November 5, 2018 showing high-grade DCIS which was ER/NM strongly positive.  She underwent an MRI November 15, 2018 showing some postbiopsy changes in the 12:00 region 3 cm from the nipple with around a 1.4 cm area of residual enhancement.  This was fairly close to the skin and she underwent a right breast mastectomy with attempted sentinel lymph node biopsy and right-sided MANNY flap reconstruction and left-sided reduction mastopexy by Dr. Rosales on January 7, 2019.  The dye did not travel to the right axilla due to her prior axillary dissection so no further nodes were removed.  Final pathology just showed DCIS in the right breast with a close anterior margin but final anterior margin was negative.  She remains on tamoxifen.  She underwent her last left breast mammography and ultrasound which was reviewed from July 28, 2023 and performed at [facility] which showed no suspicious findings.  On exam today, she has no evidence of recurrence.  She we will follow-up again in 6 months and then yearly afterwards and her next left breast mammography and ultrasound will be due in July 2024 and she was given prescriptions.  She can now stop the tamoxifen after 5 years of treatment.

## 2024-02-14 NOTE — HISTORY OF PRESENT ILLNESS
[FreeTextEntry1] : The patient is a 68-year-old G4, P3 postmenopausal white female with Delia, Cypriot, and Kazakh descent.  She underwent menarche at age 13.  She underwent menopause at age 53 and never took any hormone replacement therapy.  She has a family history of breast cancer with her paternal aunts with breast cancer in her 40s and her maternal grandmother who had ovarian and uterine cancer in her 50s.  The patient was found to have a significant area of microcalcifications in the upper outer aspect of the right breast on mammography in 1997 and underwent a right breast partial mastectomy and had extensive intermediate grade DCIS and 12 negative nodes on October 13, 1997.  The DCIS was ER/VT positive.  She underwent external beam radiation to the right breast but did not receive any adjuvant hormonal therapy.  She underwent [employer] genetic panel testing which was negative in March 2016.  She underwent her routine bilateral mammography and ultrasound in April 2018 and had a group of punctate calcifications in the right breast 12:00 region 3 cm from the nipple which had a fairly benign appearance.  She then underwent a follow-up diagnostic mammography on October 16, 2018 which continued to show these calcifications and stereotactic biopsy was performed on November 5, 2018 showing high-grade DCIS which was ER/VT strongly positive.  She underwent an MRI November 15, 2018 showing some postbiopsy changes in the 12:00 region 3 cm from the nipple with around a 1.4 cm area of residual enhancement.  This was fairly close to the skin and she underwent a right breast mastectomy with attempted sentinel lymph node biopsy and right-sided MANNY flap reconstruction and left-sided reduction mastopexy by Dr. Rosales on January 7, 2019.  The dye did not travel to the right axilla due to her prior axillary dissection so no further nodes were removed.  Final pathology just showed DCIS in the right breast with a close anterior margin but final anterior margin was negative.  She was placed on tamoxifen which she took for 5 years and stopped in 2024.  She comes in for routine follow-up.

## 2024-02-14 NOTE — PHYSICAL EXAM
[Normocephalic] : normocephalic [Atraumatic] : atraumatic [EOMI] : extra ocular movement intact [Supple] : supple [No Supraclavicular Adenopathy] : no supraclavicular adenopathy [No Cervical Adenopathy] : no cervical adenopathy [Normal Sinus Rhythm] : normal sinus rhythm [Examined in the supine and seated position] : examined in the supine and seated position [No dominant masses] : no dominant masses in right breast  [No dominant masses] : no dominant masses left breast [No Nipple Retraction] : no left nipple retraction [No Nipple Discharge] : no left nipple discharge [Breast Mass Right Breast ___cm] : no masses [Breast Mass Left Breast ___cm] : no masses [No Axillary Lymphadenopathy] : no left axillary lymphadenopathy [No Edema] : no edema [No Rashes] : no rashes [No Ulceration] : no ulceration [Breast Nipple Inversion Left] : nipple not inverted [Breast Nipple Retraction Left] : nipple not retracted [Breast Nipple Flattening Left] : nipple not flattened [Breast Nipple Fissures Left] : nipple not fissured [Breast Abnormal Lactation (Galactorrhea) Left] : no galactorrhea [Breast Abnormal Secretion Bloody Fluid Left] : no bloody discharge [Breast Abnormal Secretion Serous Fluid Left] : no serous discharge [Breast Abnormal Secretion Opalescent Fluid Left] : no milky discharge [de-identified] : The patient has the right breast mastectomy and MANNY flap reconstruction with a left breast mastopexy.  She has an excellent cosmetic result and has no evidence of recurrence in the right MANNY flap or in the left breast.  She did have a nipple reconstruction on October 2019.  She has no axillary, supraclavicular, or cervical adenopathy. [de-identified] : Status post mastectomy and MANNY flap reconstruction with no evidence of recurrence [de-identified] : Status post left breast mastopexy for symmetry

## 2024-04-08 ENCOUNTER — RX RENEWAL (OUTPATIENT)
Age: 69
End: 2024-04-08

## 2024-07-26 ENCOUNTER — NON-APPOINTMENT (OUTPATIENT)
Age: 69
End: 2024-07-26

## 2024-07-26 NOTE — HISTORY OF PRESENT ILLNESS
[FreeTextEntry1] : The patient is a 69-year-old G4, P3 postmenopausal white female with Delia, Iranian, and Lithuanian descent.  She underwent menarche at age 13.  She underwent menopause at age 53 and never took any hormone replacement therapy.  She has a family history of breast cancer with her paternal aunts with breast cancer in her 40s and her maternal grandmother who had ovarian and uterine cancer in her 50s.  The patient was found to have a significant area of microcalcifications in the upper outer aspect of the right breast on mammography in 1997 and underwent a right breast partial mastectomy and had extensive intermediate grade DCIS and 12 negative nodes on October 13, 1997.  The DCIS was ER/IA positive.  She underwent external beam radiation to the right breast but did not receive any adjuvant hormonal therapy.  She underwent [employer] genetic panel testing which was negative in March 2016.  She underwent her routine bilateral mammography and ultrasound in April 2018 and had a group of punctate calcifications in the right breast 12:00 region 3 cm from the nipple which had a fairly benign appearance.  She then underwent a follow-up diagnostic mammography on October 16, 2018 which continued to show these calcifications and stereotactic biopsy was performed on November 5, 2018 showing high-grade DCIS which was ER/IA strongly positive.  She underwent an MRI November 15, 2018 showing some postbiopsy changes in the 12:00 region 3 cm from the nipple with around a 1.4 cm area of residual enhancement.  This was fairly close to the skin and she underwent a right breast mastectomy with attempted sentinel lymph node biopsy and right-sided MANNY flap reconstruction and left-sided reduction mastopexy by Dr. Rosales on January 7, 2019.  The dye did not travel to the right axilla due to her prior axillary dissection so no further nodes were removed.  Final pathology just showed DCIS in the right breast with a close anterior margin but final anterior margin was negative.  She was placed on tamoxifen which she took for 5 years and stopped in 2024.  She comes in for routine follow-up.

## 2024-07-26 NOTE — PHYSICAL EXAM
[Normocephalic] : normocephalic [Atraumatic] : atraumatic [EOMI] : extra ocular movement intact [Supple] : supple [No Supraclavicular Adenopathy] : no supraclavicular adenopathy [No Cervical Adenopathy] : no cervical adenopathy [Normal Sinus Rhythm] : normal sinus rhythm [Examined in the supine and seated position] : examined in the supine and seated position [No dominant masses] : no dominant masses in right breast  [No dominant masses] : no dominant masses left breast [No Nipple Retraction] : no left nipple retraction [No Nipple Discharge] : no left nipple discharge [Breast Mass Right Breast ___cm] : no masses [Breast Nipple Inversion Left] : nipple not inverted [Breast Nipple Retraction Left] : nipple not retracted [Breast Nipple Flattening Left] : nipple not flattened [Breast Nipple Fissures Left] : nipple not fissured [Breast Abnormal Lactation (Galactorrhea) Left] : no galactorrhea [Breast Abnormal Secretion Serous Fluid Left] : no serous discharge [Breast Abnormal Secretion Bloody Fluid Left] : no bloody discharge [Breast Abnormal Secretion Opalescent Fluid Left] : no milky discharge [Breast Mass Left Breast ___cm] : no masses [No Axillary Lymphadenopathy] : no left axillary lymphadenopathy [No Edema] : no edema [No Rashes] : no rashes [No Ulceration] : no ulceration [de-identified] : The patient has the right breast mastectomy and MANNY flap reconstruction with a left breast mastopexy.  She has an excellent cosmetic result and has no evidence of recurrence in the right MANNY flap or in the left breast.  She did have a nipple reconstruction on October 2019.  She has no axillary, supraclavicular, or cervical adenopathy. [de-identified] : Status post mastectomy and MANNY flap reconstruction with no evidence of recurrence [de-identified] : Status post left breast mastopexy for symmetry

## 2024-07-26 NOTE — ASSESSMENT
[FreeTextEntry1] : The patient is a 69-year-old G4, P3 postmenopausal white female with Delia, Tuvaluan, and Divehi descent.  She underwent menarche at age 13.  She underwent menopause at age 53 and never took any hormone replacement therapy.  She has a family history of breast cancer with her paternal aunt who had breast cancer in her 40s and her maternal grandmother who had ovarian and uterine cancer in her 50s.  The patient was found to have a significant area of microcalcifications in the upper outer aspect of the right breast on mammography in 1997 and underwent a right breast partial mastectomy and had extensive intermediate grade DCIS and 12 negative nodes on October 13, 1997.  The DCIS was ER/UT positive.  She underwent external beam radiation to the right breast but did not receive any adjuvant hormonal therapy.  She underwent [employer] genetic panel testing which was negative in March 2016.  She underwent her routine bilateral mammography and ultrasound in April 2018 and had a group of punctate calcifications in the right breast 12:00 region 3 cm from the nipple which had a fairly benign appearance.  She then underwent a follow-up diagnostic mammography on October 16, 2018 which continued to show these calcifications and stereotactic biopsy was performed on November 5, 2018 showing high-grade DCIS which was ER/UT strongly positive.  She underwent an MRI November 15, 2018 showing some postbiopsy changes in the 12:00 region 3 cm from the nipple with around a 1.4 cm area of residual enhancement.  This was fairly close to the skin and she underwent a right breast mastectomy with attempted sentinel lymph node biopsy and right-sided MANNY flap reconstruction and left-sided reduction mastopexy by Dr. Rosales on January 7, 2019.  The dye did not travel to the right axilla due to her prior axillary dissection so no further nodes were removed.  Final pathology just showed DCIS in the right breast with a close anterior margin but final anterior margin was negative.  She remains on tamoxifen.  She underwent her last left breast mammography and ultrasound which was reviewed from ?????? July 28, 2023 and performed at [facility] which showed no suspicious findings.  On exam today, she has no evidence of recurrence.  She should follow-up again in 1 year and her next left breast mammography and ultrasound will be due in ?????? July 2025 and she was given prescriptions.  She stopped her tamoxifen in 2024 after 5 years of treatment.

## 2024-08-13 DIAGNOSIS — R92.30 DENSE BREASTS, UNSPECIFIED: ICD-10-CM

## 2024-08-13 DIAGNOSIS — R92.323 MAMMOGRAPHIC FIBROGLANDULAR DENSITY, BILATERAL BREASTS: ICD-10-CM

## 2024-08-13 DIAGNOSIS — Z12.31 ENCOUNTER FOR SCREENING MAMMOGRAM FOR MALIGNANT NEOPLASM OF BREAST: ICD-10-CM

## 2024-08-15 ENCOUNTER — APPOINTMENT (OUTPATIENT)
Dept: BREAST CENTER | Facility: CLINIC | Age: 69
End: 2024-08-15
Payer: MEDICARE

## 2024-08-15 VITALS
SYSTOLIC BLOOD PRESSURE: 133 MMHG | OXYGEN SATURATION: 95 % | WEIGHT: 180 LBS | HEIGHT: 64 IN | BODY MASS INDEX: 30.73 KG/M2 | HEART RATE: 86 BPM | DIASTOLIC BLOOD PRESSURE: 86 MMHG

## 2024-08-15 DIAGNOSIS — Z85.3 PERSONAL HISTORY OF MALIGNANT NEOPLASM OF BREAST: ICD-10-CM

## 2024-08-15 DIAGNOSIS — Z80.41 FAMILY HISTORY OF MALIGNANT NEOPLASM OF OVARY: ICD-10-CM

## 2024-08-15 DIAGNOSIS — Z90.11 ACQUIRED ABSENCE OF RIGHT BREAST AND NIPPLE: ICD-10-CM

## 2024-08-15 DIAGNOSIS — Z80.3 FAMILY HISTORY OF MALIGNANT NEOPLASM OF BREAST: ICD-10-CM

## 2024-08-15 PROCEDURE — G2211 COMPLEX E/M VISIT ADD ON: CPT

## 2024-08-15 PROCEDURE — 99213 OFFICE O/P EST LOW 20 MIN: CPT

## 2024-08-15 NOTE — HISTORY OF PRESENT ILLNESS
[FreeTextEntry1] : The patient is a 69-year-old G4, P3 postmenopausal white female with Delia, [place]er, and Maltese descent.  She underwent menarche at age 13.  She underwent menopause at age 53 and never took any hormone replacement therapy.  She has a family history of breast cancer with her paternal aunts with breast cancer in her 40s and her maternal grandmother who had ovarian and uterine cancer in her 50s.  The patient was found to have a significant area of microcalcifications in the upper outer aspect of the right breast on mammography in 1997 and underwent a right breast partial mastectomy and had extensive intermediate grade DCIS and 12 negative nodes on October 13, 1997.  The DCIS was ER/CO positive.  She underwent external beam radiation to the right breast but did not receive any adjuvant hormonal therapy.  She underwent [employer] genetic panel testing which was negative in March 2016.  She underwent her routine bilateral mammography and ultrasound in April 2018 and had a group of punctate calcifications in the right breast 12:00 region 3 cm from the nipple which had a fairly benign appearance.  She then underwent a follow-up diagnostic mammography on October 16, 2018 which continued to show these calcifications and stereotactic biopsy was performed on November 5, 2018 showing high-grade DCIS which was ER/CO strongly positive.  She underwent an MRI November 15, 2018 showing some postbiopsy changes in the 12:00 region 3 cm from the nipple with around a 1.4 cm area of residual enhancement.  This was fairly close to the skin and she underwent a right breast mastectomy with attempted sentinel lymph node biopsy and right-sided MANNY flap reconstruction and left-sided reduction mastopexy by Dr. Rosales on January 7, 2019.  The dye did not travel to the right axilla due to her prior axillary dissection so no further nodes were removed.  Final pathology just showed DCIS in the right breast with a close anterior margin but final anterior margin was negative.  She was placed on tamoxifen which she took for 5 years and stopped in 2024.  She comes in for routine follow-up.

## 2024-08-15 NOTE — PHYSICAL EXAM
[Normocephalic] : normocephalic [Atraumatic] : atraumatic [EOMI] : extra ocular movement intact [Supple] : supple [No Supraclavicular Adenopathy] : no supraclavicular adenopathy [No Cervical Adenopathy] : no cervical adenopathy [Normal Sinus Rhythm] : normal sinus rhythm [Examined in the supine and seated position] : examined in the supine and seated position [No dominant masses] : no dominant masses in right breast  [No Nipple Retraction] : no left nipple retraction [No dominant masses] : no dominant masses left breast [No Nipple Discharge] : no left nipple discharge [Breast Mass Right Breast ___cm] : no masses [Breast Mass Left Breast ___cm] : no masses [No Axillary Lymphadenopathy] : no left axillary lymphadenopathy [No Edema] : no edema [No Rashes] : no rashes [No Ulceration] : no ulceration [Breast Nipple Inversion Left] : nipple not inverted [Breast Nipple Retraction Left] : nipple not retracted [Breast Nipple Flattening Left] : nipple not flattened [Breast Nipple Fissures Left] : nipple not fissured [Breast Abnormal Lactation (Galactorrhea) Left] : no galactorrhea [Breast Abnormal Secretion Bloody Fluid Left] : no bloody discharge [Breast Abnormal Secretion Serous Fluid Left] : no serous discharge [Breast Abnormal Secretion Opalescent Fluid Left] : no milky discharge [de-identified] : The patient has the right breast mastectomy and MANNY flap reconstruction with a left breast mastopexy.  She has an excellent cosmetic result and has no evidence of recurrence in the right MANNY flap or in the left breast.  She did have a nipple reconstruction on October 2019.  She has no axillary, supraclavicular, or cervical adenopathy. [de-identified] : Status post mastectomy and MANNY flap reconstruction with no evidence of recurrence [de-identified] : Status post left breast mastopexy for symmetry

## 2024-08-15 NOTE — HISTORY OF PRESENT ILLNESS
[FreeTextEntry1] : The patient is a 69-year-old G4, P3 postmenopausal white female with Delia, English, and Frisian descent.  She underwent menarche at age 13.  She underwent menopause at age 53 and never took any hormone replacement therapy.  She has a family history of breast cancer with her paternal aunts with breast cancer in her 40s and her maternal grandmother who had ovarian and uterine cancer in her 50s.  The patient was found to have a significant area of microcalcifications in the upper outer aspect of the right breast on mammography in 1997 and underwent a right breast partial mastectomy and had extensive intermediate grade DCIS and 12 negative nodes on October 13, 1997.  The DCIS was ER/CO positive.  She underwent external beam radiation to the right breast but did not receive any adjuvant hormonal therapy.  She underwent [employer] genetic panel testing which was negative in March 2016.  She underwent her routine bilateral mammography and ultrasound in April 2018 and had a group of punctate calcifications in the right breast 12:00 region 3 cm from the nipple which had a fairly benign appearance.  She then underwent a follow-up diagnostic mammography on October 16, 2018 which continued to show these calcifications and stereotactic biopsy was performed on November 5, 2018 showing high-grade DCIS which was ER/CO strongly positive.  She underwent an MRI November 15, 2018 showing some postbiopsy changes in the 12:00 region 3 cm from the nipple with around a 1.4 cm area of residual enhancement.  This was fairly close to the skin and she underwent a right breast mastectomy with attempted sentinel lymph node biopsy and right-sided MANNY flap reconstruction and left-sided reduction mastopexy by Dr. Rosales on January 7, 2019.  The dye did not travel to the right axilla due to her prior axillary dissection so no further nodes were removed.  Final pathology just showed DCIS in the right breast with a close anterior margin but final anterior margin was negative.  She was placed on tamoxifen which she took for 5 years and stopped in 2024.  She comes in for routine follow-up.

## 2024-08-15 NOTE — ADDENDUM
[FreeTextEntry1] : I spent greater than 75% of consultation in face-to-face counseling and coordination care in this patient with a history of right breast cancer underwent mastectomy and comes in now for routine breast cancer screening/surveillance.

## 2024-08-15 NOTE — PHYSICAL EXAM
[Normocephalic] : normocephalic [Atraumatic] : atraumatic [EOMI] : extra ocular movement intact [Supple] : supple [No Supraclavicular Adenopathy] : no supraclavicular adenopathy [No Cervical Adenopathy] : no cervical adenopathy [Normal Sinus Rhythm] : normal sinus rhythm [Examined in the supine and seated position] : examined in the supine and seated position [No dominant masses] : no dominant masses in right breast  [No Nipple Retraction] : no left nipple retraction [No dominant masses] : no dominant masses left breast [No Nipple Discharge] : no left nipple discharge [Breast Mass Right Breast ___cm] : no masses [Breast Mass Left Breast ___cm] : no masses [No Axillary Lymphadenopathy] : no left axillary lymphadenopathy [No Edema] : no edema [No Rashes] : no rashes [No Ulceration] : no ulceration [Breast Nipple Inversion Left] : nipple not inverted [Breast Nipple Retraction Left] : nipple not retracted [Breast Nipple Flattening Left] : nipple not flattened [Breast Nipple Fissures Left] : nipple not fissured [Breast Abnormal Lactation (Galactorrhea) Left] : no galactorrhea [Breast Abnormal Secretion Bloody Fluid Left] : no bloody discharge [Breast Abnormal Secretion Serous Fluid Left] : no serous discharge [Breast Abnormal Secretion Opalescent Fluid Left] : no milky discharge [de-identified] : The patient has the right breast mastectomy and MANNY flap reconstruction with a left breast mastopexy.  She has an excellent cosmetic result and has no evidence of recurrence in the right MANNY flap or in the left breast.  She did have a nipple reconstruction on October 2019.  She has no axillary, supraclavicular, or cervical adenopathy. [de-identified] : Status post mastectomy and MANNY flap reconstruction with no evidence of recurrence [de-identified] : Status post left breast mastopexy for symmetry

## 2024-08-15 NOTE — ASSESSMENT
[FreeTextEntry1] : The patient is a 69-year-old G4, P3 postmenopausal white female with Delia, Thai, and Irish descent.  She underwent menarche at age 13.  She underwent menopause at age 53 and never took any hormone replacement therapy.  She has a family history of breast cancer with her paternal aunt who had breast cancer in her 40s and her maternal grandmother who had ovarian and uterine cancer in her 50s.  The patient was found to have a significant area of microcalcifications in the upper outer aspect of the right breast on mammography in 1997 and underwent a right breast partial mastectomy and had extensive intermediate grade DCIS and 12 negative nodes on October 13, 1997.  The DCIS was ER/MD positive.  She underwent external beam radiation to the right breast but did not receive any adjuvant hormonal therapy.  She underwent [employer] genetic panel testing which was negative in March 2016.  She underwent her routine bilateral mammography and ultrasound in April 2018 and had a group of punctate calcifications in the right breast 12:00 region 3 cm from the nipple which had a fairly benign appearance.  She then underwent a follow-up diagnostic mammography on October 16, 2018 which continued to show these calcifications and stereotactic biopsy was performed on November 5, 2018 showing high-grade DCIS which was ER/MD strongly positive.  She underwent an MRI November 15, 2018 showing some postbiopsy changes in the 12:00 region 3 cm from the nipple with around a 1.4 cm area of residual enhancement.  This was fairly close to the skin and she underwent a right breast mastectomy with attempted sentinel lymph node biopsy and right-sided MANNY flap reconstruction and left-sided reduction mastopexy by Dr. Rosales on January 7, 2019.  The dye did not travel to the right axilla due to her prior axillary dissection so no further nodes were removed.  Final pathology just showed DCIS in the right breast with a close anterior margin but final anterior margin was negative.  She remains on tamoxifen.  She underwent her last left breast mammography which was reviewed from August 2, 2024 and performed at [facility] which showed no suspicious findings.  On exam today, she has no evidence of recurrence.  She should follow-up again in 1 year and her next left breast mammography will be due in August 2025 and she was given prescriptions.  She no longer requires ultrasounds due to her breast density.  She stopped her tamoxifen in 2024 after 5 years of treatment.

## 2024-08-15 NOTE — ASSESSMENT
[FreeTextEntry1] : The patient is a 69-year-old G4, P3 postmenopausal white female with Del[place], [place], and [place] descent.  She underwent menarche at age 13.  She underwent menopause at age 53 and never took any hormone replacement therapy.  She has a family history of breast cancer with her paternal aunt who had breast cancer in her 40s and her maternal grandmother who had ovarian and uterine cancer in her 50s.  The patient was found to have a significant area of microcalcifications in the upper outer aspect of the right breast on mammography in 1997 and underwent a right breast partial mastectomy and had extensive intermediate grade DCIS and 12 negative nodes on October 13, 1997.  The DCIS was ER/AL positive.  She underwent external beam radiation to the right breast but did not receive any adjuvant hormonal therapy.  She underwent [employer] genetic panel testing which was negative in March 2016.  She underwent her routine bilateral mammography and ultrasound in April 2018 and had a group of punctate calcifications in the right breast 12:00 region 3 cm from the nipple which had a fairly benign appearance.  She then underwent a follow-up diagnostic mammography on October 16, 2018 which continued to show these calcifications and stereotactic biopsy was performed on November 5, 2018 showing high-grade DCIS which was ER/AL strongly positive.  She underwent an MRI November 15, 2018 showing some postbiopsy changes in the 12:00 region 3 cm from the nipple with around a 1.4 cm area of residual enhancement.  This was fairly close to the skin and she underwent a right breast mastectomy with attempted sentinel lymph node biopsy and right-sided MANNY flap reconstruction and left-sided reduction mastopexy by Dr. Rosales on January 7, 2019.  The dye did not travel to the right axilla due to her prior axillary dissection so no further nodes were removed.  Final pathology just showed DCIS in the right breast with a close anterior margin but final anterior margin was negative.  She remains on tamoxifen.  She underwent her last left breast mammography which was reviewed from August 2, 2024 and performed at [facility] which showed no suspicious findings.  On exam today, she has no evidence of recurrence.  She should follow-up again in 1 year and her next left breast mammography will be due in August 2025 and she was given prescriptions.  She no longer requires ultrasounds due to her breast density.  She stopped her tamoxifen in 2024 after 5 years of treatment.

## 2025-07-04 ENCOUNTER — NON-APPOINTMENT (OUTPATIENT)
Age: 70
End: 2025-07-04

## 2025-08-19 ENCOUNTER — NON-APPOINTMENT (OUTPATIENT)
Age: 70
End: 2025-08-19

## 2025-08-21 ENCOUNTER — APPOINTMENT (OUTPATIENT)
Dept: BREAST CENTER | Facility: CLINIC | Age: 70
End: 2025-08-21
Payer: MEDICARE

## 2025-08-21 VITALS
BODY MASS INDEX: 29.88 KG/M2 | WEIGHT: 175 LBS | DIASTOLIC BLOOD PRESSURE: 83 MMHG | HEART RATE: 76 BPM | HEIGHT: 64 IN | SYSTOLIC BLOOD PRESSURE: 128 MMHG

## 2025-08-21 DIAGNOSIS — Z12.31 ENCOUNTER FOR SCREENING MAMMOGRAM FOR MALIGNANT NEOPLASM OF BREAST: ICD-10-CM

## 2025-08-21 DIAGNOSIS — D05.11 INTRADUCTAL CARCINOMA IN SITU OF RIGHT BREAST: ICD-10-CM

## 2025-08-21 DIAGNOSIS — Z87.42 PERSONAL HISTORY OF OTHER DISEASES OF THE FEMALE GENITAL TRACT: ICD-10-CM

## 2025-08-21 DIAGNOSIS — Z90.11 ACQUIRED ABSENCE OF RIGHT BREAST AND NIPPLE: ICD-10-CM

## 2025-08-21 DIAGNOSIS — Z85.3 PERSONAL HISTORY OF MALIGNANT NEOPLASM OF BREAST: ICD-10-CM

## 2025-08-21 DIAGNOSIS — Z80.41 FAMILY HISTORY OF MALIGNANT NEOPLASM OF OVARY: ICD-10-CM

## 2025-08-21 DIAGNOSIS — Z80.3 FAMILY HISTORY OF MALIGNANT NEOPLASM OF BREAST: ICD-10-CM

## 2025-08-21 PROCEDURE — 99213 OFFICE O/P EST LOW 20 MIN: CPT

## 2025-08-21 PROCEDURE — G2211 COMPLEX E/M VISIT ADD ON: CPT

## 2025-08-21 RX ORDER — BRIMONIDINE TARTRATE 1 MG/ML
0.1 SOLUTION/ DROPS OPHTHALMIC
Refills: 0 | Status: ACTIVE | COMMUNITY